# Patient Record
Sex: FEMALE | Race: WHITE | NOT HISPANIC OR LATINO | Employment: OTHER | RURAL
[De-identification: names, ages, dates, MRNs, and addresses within clinical notes are randomized per-mention and may not be internally consistent; named-entity substitution may affect disease eponyms.]

---

## 2021-05-05 ENCOUNTER — HISTORICAL (OUTPATIENT)
Dept: ADMINISTRATIVE | Facility: HOSPITAL | Age: 66
End: 2021-05-05

## 2021-05-05 LAB — SARS-COV+SARS-COV-2 AG RESP QL IA.RAPID: NEGATIVE

## 2021-11-03 ENCOUNTER — HOSPITAL ENCOUNTER (OUTPATIENT)
Dept: RADIOLOGY | Facility: HOSPITAL | Age: 66
Discharge: HOME | End: 2021-11-03
Attending: RADIOLOGY
Payer: OTHER GOVERNMENT

## 2021-11-03 VITALS — WEIGHT: 150 LBS | HEIGHT: 60 IN | BODY MASS INDEX: 29.45 KG/M2

## 2021-11-03 DIAGNOSIS — Z12.31 VISIT FOR SCREENING MAMMOGRAM: ICD-10-CM

## 2021-11-03 PROCEDURE — 77067 MAMMO DIGITAL SCREENING BILAT: ICD-10-PCS | Mod: 26,,, | Performed by: RADIOLOGY

## 2021-11-03 PROCEDURE — 77067 SCR MAMMO BI INCL CAD: CPT | Mod: 26,,, | Performed by: RADIOLOGY

## 2021-11-03 PROCEDURE — 77067 SCR MAMMO BI INCL CAD: CPT | Mod: TC

## 2022-05-17 ENCOUNTER — HOSPITAL ENCOUNTER (EMERGENCY)
Facility: HOSPITAL | Age: 67
Discharge: HOME | End: 2022-05-17
Attending: INTERNAL MEDICINE
Payer: MEDICARE

## 2022-05-17 VITALS
WEIGHT: 143.5 LBS | HEIGHT: 60 IN | SYSTOLIC BLOOD PRESSURE: 159 MMHG | HEART RATE: 62 BPM | OXYGEN SATURATION: 98 % | TEMPERATURE: 98 F | DIASTOLIC BLOOD PRESSURE: 88 MMHG | RESPIRATION RATE: 18 BRPM | BODY MASS INDEX: 28.17 KG/M2
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
## 2022-05-17 DIAGNOSIS — S09.90XA CLOSED HEAD INJURY, INITIAL ENCOUNTER: Primary | ICD-10-CM

## 2022-05-17 LAB
BASOPHILS # BLD AUTO: 0.04 K/UL (ref 0–0.2)
BASOPHILS NFR BLD AUTO: 0.9 % (ref 0–1)
DIFFERENTIAL METHOD BLD: ABNORMAL
EOSINOPHIL # BLD AUTO: 0.1 K/UL (ref 0–0.5)
EOSINOPHIL NFR BLD AUTO: 2.2 % (ref 1–4)
ERYTHROCYTE [DISTWIDTH] IN BLOOD BY AUTOMATED COUNT: 12.4 % (ref 11.5–14.5)
HCT VFR BLD AUTO: 38.7 % (ref 38–47)
HGB BLD-MCNC: 12.4 G/DL (ref 12–16)
IMM GRANULOCYTES # BLD AUTO: 0 K/UL (ref 0–0.04)
IMM GRANULOCYTES NFR BLD: 0 % (ref 0–0.4)
LYMPHOCYTES # BLD AUTO: 1.58 K/UL (ref 1–4.8)
LYMPHOCYTES NFR BLD AUTO: 34.9 % (ref 27–41)
MCH RBC QN AUTO: 31.2 PG (ref 27–31)
MCHC RBC AUTO-ENTMCNC: 32 G/DL (ref 32–36)
MCV RBC AUTO: 97.2 FL (ref 80–96)
MONOCYTES # BLD AUTO: 0.25 K/UL (ref 0–0.8)
MONOCYTES NFR BLD AUTO: 5.5 % (ref 2–6)
MPC BLD CALC-MCNC: 9.2 FL (ref 9.4–12.4)
NEUTROPHILS # BLD AUTO: 2.56 K/UL (ref 1.8–7.7)
NEUTROPHILS NFR BLD AUTO: 56.5 % (ref 53–65)
PLATELET # BLD AUTO: 250 K/UL (ref 150–400)
RBC # BLD AUTO: 3.98 M/UL (ref 4.2–5.4)
WBC # BLD AUTO: 4.53 K/UL (ref 4.5–11)

## 2022-05-17 PROCEDURE — 99282 EMERGENCY DEPT VISIT SF MDM: CPT | Performed by: INTERNAL MEDICINE

## 2022-05-17 PROCEDURE — 85025 COMPLETE CBC W/AUTO DIFF WBC: CPT | Performed by: INTERNAL MEDICINE

## 2022-05-17 PROCEDURE — 25000003 PHARM REV CODE 250: Performed by: INTERNAL MEDICINE

## 2022-05-17 PROCEDURE — 36415 COLL VENOUS BLD VENIPUNCTURE: CPT | Performed by: INTERNAL MEDICINE

## 2022-05-17 PROCEDURE — 99284 EMERGENCY DEPT VISIT MOD MDM: CPT | Mod: 25

## 2022-05-17 RX ORDER — LEVOTHYROXINE SODIUM 112 UG/1
0.11 TABLET ORAL
COMMUNITY
Start: 2021-09-02

## 2022-05-17 RX ORDER — TRAZODONE HYDROCHLORIDE 100 MG/1
150 TABLET ORAL
COMMUNITY
Start: 2021-12-20

## 2022-05-17 RX ORDER — BACITRACIN ZINC 500 [USP'U]/G
OINTMENT TOPICAL
Status: COMPLETED | OUTPATIENT
Start: 2022-05-17 | End: 2022-05-17

## 2022-05-17 RX ORDER — ACETAMINOPHEN 325 MG/1
650 TABLET ORAL
Status: COMPLETED | OUTPATIENT
Start: 2022-05-17 | End: 2022-05-17

## 2022-05-17 RX ORDER — ESTRADIOL 0.04 MG/D
PATCH TRANSDERMAL
COMMUNITY
Start: 2021-12-20 | End: 2023-04-05

## 2022-05-17 RX ADMIN — ACETAMINOPHEN 650 MG: 325 TABLET, FILM COATED ORAL at 10:05

## 2022-05-17 RX ADMIN — BACITRACIN: 500 OINTMENT TOPICAL at 09:05

## 2022-05-17 NOTE — ED NOTES
Ice pack placed to back of occiput- hematoma noted - dr watkins at bedside discussing pt findings with pt and daughter

## 2022-05-17 NOTE — ED PROVIDER NOTES
Encounter Date: 5/17/2022       History     Chief Complaint   Patient presents with    Fall     Patient presents with complaints of a fall this morning at home.  Patient states that she fell into a garbage container and hit the back of her head backwards.  Complaining of soreness the head.  There was no loss of conscious, incontinence urine or bowel, distal paralysis.  Patient denies being on any blood thinners.  And denies any other injuries.  Patient does have a history of osteopenia and osteoporosis        Review of patient's allergies indicates:   Allergen Reactions    Aleve [naproxen sodium]      History reviewed. No pertinent past medical history.  Past Surgical History:   Procedure Laterality Date    HYSTERECTOMY      OOPHORECTOMY       Family History   Problem Relation Age of Onset    Breast cancer Sister         Review of Systems   Constitutional: Negative for fever.   HENT: Negative for sore throat.    Respiratory: Negative for shortness of breath.    Cardiovascular: Negative for chest pain.   Gastrointestinal: Negative for nausea.   Genitourinary: Negative for dysuria.   Musculoskeletal: Negative for back pain.   Skin: Negative for rash.   Neurological: Negative for weakness.   Hematological: Does not bruise/bleed easily.       Physical Exam     Initial Vitals [05/17/22 0857]   BP Pulse Resp Temp SpO2   (!) 159/88 62 18 97.9 °F (36.6 °C) 98 %      MAP       --         Physical Exam    Vitals reviewed.  Constitutional: She appears well-developed.   HENT:   Head:       4 x 3 cm hematoma in the occipital area but no laceration or bleeding.   Eyes: Pupils are equal, round, and reactive to light.   Neck: Trachea normal. Neck supple.   Normal range of motion.   Full passive range of motion without pain.     Cardiovascular: Normal rate.   Pulmonary/Chest: Breath sounds normal.   Abdominal: Abdomen is soft.   Musculoskeletal:         General: Normal range of motion.      Cervical back: Full passive range of  motion without pain, normal range of motion and neck supple.     Neurological: She is alert. She has normal strength and normal reflexes. No cranial nerve deficit or sensory deficit. GCS eye subscore is 4. GCS verbal subscore is 5. GCS motor subscore is 6.   Skin: Skin is warm.        Superficial skin tear no bleeding   Psychiatric: She has a normal mood and affect.         Medical Screening Exam   See Full Note    ED Course   Procedures  Labs Reviewed   CBC WITH DIFFERENTIAL - Abnormal; Notable for the following components:       Result Value    RBC 3.98 (*)     MCV 97.2 (*)     MCH 31.2 (*)     MPV 9.2 (*)     All other components within normal limits   CBC W/ AUTO DIFFERENTIAL    Narrative:     The following orders were created for panel order CBC auto differential.  Procedure                               Abnormality         Status                     ---------                               -----------         ------                     CBC with Differential[[id number]]        Abnormal            Final result                 Please view results for these tests on the individual orders.          Imaging Results          CT Cervical Spine Without Contrast (Final result)  Result time 05/17/22 09:28:08    Final result by Keaton Cárdenas DO (05/17/22 09:28:08)                 Impression:      No convincing CT evidence of acute injury involving the osseous cervical spine.    The CT exam was performed using one or more of the following dose    reduction techniques- Automated exposure control, adjustment of the mA    and/or kV according to patient size, and/or use of iterative    reconstructed technique.    Point of Service: [facility]      Electronically signed by: Keaton Cárdenas  Date:    05/17/2022  Time:    09:28             Narrative:    EXAMINATION:  CT CERVICAL SPINE WITHOUT CONTRAST    CLINICAL HISTORY:  Neck trauma (Age >= 65y);    COMPARISON:  None    TECHNIQUE:  Multiple axial tomographic images of  the cervical spine were obtained without the use of intravenous contrast. Coronal and sagittal reformatted images provided.    FINDINGS:  There is straightening of normal cervical lordosis which may be positional or secondary to muscle spasm. There is no significant anterolisthesis or retrolisthesis.  Scattered degenerative change of the cervical spine.  Cervical vertebral body heights appear maintained.                               CT Head Without Contrast (Final result)  Result time 05/17/22 09:25:42    Final result by Dianne Sneed MD (05/17/22 09:25:42)                 Impression:      No acute intracranial pathology seen    This CT exam was performed using one or more of the following dose reduction techniques: Automated exposure control, adjustment of the mA and/or kV according to patient's size, or use of iterative reconstruction technique.      Electronically signed by: Dianne Sneed  Date:    05/17/2022  Time:    09:25             Narrative:    EXAMINATION:  CT HEAD WITHOUT CONTRAST    CLINICAL HISTORY:  Head trauma, minor (Age >= 65y);    FINDINGS:  Lateral ventricles are normal in size.  No acute intracranial hemorrhage, mass effect, or evidence of acute cortical stroke seen.  Artifact felt to be present at the periphery of the right cerebral hemisphere.  Soft tissue hematoma present at the convexity without acute underlying fracture seen.                                 Medications   bacitracin zinc ointment (has no administration in time range)   acetaminophen tablet 650 mg (has no administration in time range)                 ED Course as of 05/17/22 0948   Tue May 17, 2022   0935 CT Cervical Spine Without Contrast [PW]   0935 CT Head Without Contrast [PW]   0944 CBC auto differential(!) [PW]   0946 Recheck patient shows neurologic exam is normal, GCS 15, still with hematoma to place ice on it. [PW]      ED Course User Index  [PW] García Johnson MD          Clinical Impression:   Final  diagnoses:  [S09.90XA] Closed head injury, initial encounter (Primary)  [W19.XXXA] Fall, initial encounter          ED Disposition Condition    Discharge Stable        ED Prescriptions     None        Follow-up Information     Follow up With Specialties Details Why Contact Info    Lizzie Beltran MD Family Medicine In 2 days  [id number]  [facility]  [place] [id number] [phone]             Garíca Johnson MD  05/17/22 0985

## 2022-05-18 ENCOUNTER — TELEPHONE (OUTPATIENT)
Dept: EMERGENCY MEDICINE | Facility: HOSPITAL | Age: 67
End: 2022-05-18
Payer: OTHER GOVERNMENT

## 2022-12-13 ENCOUNTER — HOSPITAL ENCOUNTER (OUTPATIENT)
Dept: RADIOLOGY | Facility: HOSPITAL | Age: 67
Discharge: HOME | End: 2022-12-13
Attending: RADIOLOGY
Payer: MEDICARE

## 2022-12-13 DIAGNOSIS — R92.8 ABNORMAL MAMMOGRAM: ICD-10-CM

## 2022-12-13 DIAGNOSIS — Z12.31 VISIT FOR SCREENING MAMMOGRAM: ICD-10-CM

## 2022-12-13 PROCEDURE — 77067 MAMMO DIGITAL SCREENING BILAT: ICD-10-PCS | Mod: 26,59,ICN, | Performed by: RADIOLOGY

## 2022-12-13 PROCEDURE — 76641 ULTRASOUND BREAST COMPLETE: CPT | Mod: 26,50,, | Performed by: RADIOLOGY

## 2022-12-13 PROCEDURE — 77065 DX MAMMO INCL CAD UNI: CPT | Mod: 26,LT,, | Performed by: RADIOLOGY

## 2022-12-13 PROCEDURE — 77065 MAMMO DIGITAL DIAGNOSTIC LEFT: ICD-10-PCS | Mod: 26,LT,, | Performed by: RADIOLOGY

## 2022-12-13 PROCEDURE — 77065 DX MAMMO INCL CAD UNI: CPT | Mod: TC,LT

## 2022-12-13 PROCEDURE — 76641 ULTRASOUND BREAST COMPLETE: CPT | Mod: TC,50

## 2022-12-13 PROCEDURE — 77067 SCR MAMMO BI INCL CAD: CPT | Mod: TC

## 2022-12-13 PROCEDURE — 76641 US BREAST BILATERAL COMPLETE: ICD-10-PCS | Mod: 26,50,, | Performed by: RADIOLOGY

## 2022-12-13 PROCEDURE — 77067 SCR MAMMO BI INCL CAD: CPT | Mod: 26,59,ICN, | Performed by: RADIOLOGY

## 2022-12-20 ENCOUNTER — OFFICE VISIT (OUTPATIENT)
Dept: SURGERY | Facility: CLINIC | Age: 67
End: 2022-12-20
Attending: SURGERY
Payer: MEDICARE

## 2022-12-20 ENCOUNTER — CLINICAL SUPPORT (OUTPATIENT)
Dept: CARDIOLOGY | Facility: CLINIC | Age: 67
End: 2022-12-20
Attending: SURGERY
Payer: MEDICARE

## 2022-12-20 DIAGNOSIS — R92.0 MICROCALCIFICATION OF LEFT BREAST ON MAMMOGRAM: Primary | ICD-10-CM

## 2022-12-20 DIAGNOSIS — Z01.818 ENCOUNTER FOR OTHER PREPROCEDURAL EXAMINATION: ICD-10-CM

## 2022-12-20 PROCEDURE — 99204 OFFICE O/P NEW MOD 45 MIN: CPT | Mod: S$PBB,,, | Performed by: SURGERY

## 2022-12-20 PROCEDURE — 99213 OFFICE O/P EST LOW 20 MIN: CPT | Mod: PBBFAC,25 | Performed by: SURGERY

## 2022-12-20 PROCEDURE — 99212 OFFICE O/P EST SF 10 MIN: CPT | Mod: PBBFAC

## 2022-12-20 PROCEDURE — 93010 EKG 12-LEAD: ICD-10-PCS | Mod: S$PBB,,, | Performed by: INTERNAL MEDICINE

## 2022-12-20 PROCEDURE — 93010 ELECTROCARDIOGRAM REPORT: CPT | Mod: S$PBB,,, | Performed by: INTERNAL MEDICINE

## 2022-12-20 PROCEDURE — 99204 PR OFFICE/OUTPT VISIT, NEW, LEVL IV, 45-59 MIN: ICD-10-PCS | Mod: S$PBB,,, | Performed by: SURGERY

## 2022-12-20 PROCEDURE — 93005 ELECTROCARDIOGRAM TRACING: CPT | Mod: PBBFAC | Performed by: INTERNAL MEDICINE

## 2022-12-20 NOTE — PATIENT INSTRUCTIONS
[facility]  [address] [place] [address]      YOUR SURGERY DATE IS 01/05/2022. YOU WILL GO TO DR. DOWNS'S OFFICE THAT MORNING AT 0720AM.     LABWORK AND EKG IS SCHEDULED FOR TODAY. PLEASE SIGN IN ON 1ST FLOOR OF THE [facility] FOR LAB.  EKG IS LOCATED ON 2ND FLOOR.      DO NOT EAT OR DRINK ANYTHING AFTER MIDNIGHT BEFORE YOU SURGERY    BRING ALL MEDICATIONS YOU ARE CURRENTLY TAKING WITH YOU.  IF YOU ARE TAKING  A BLOOD PRESSURE MEDICATION, TAKE YOUR BLOOD PRESSURE MEDICATION WITH A   SIP OF WATER WHEN YOU GET UP THE MORNING OF SURGERY.     YOU MUST PRE-ADMIT AT THE FOLLOWING WEBSITE:  WEBSITE: [email or web address]  PASSWORD: [email or web address]    A STAFF MEMBER FROM THE [facility] WILL CALL YOU THE DAY BEFORE  SURGERY TO LET YOU KNOW WHAT TIME TO ARRIVE THE MORNING OF SURGERY.  IF YOU HAVE NOT HEARD FROM THEM BY 4 P.M. THE DAY BEFORE YOUR SURGERY,   PLEASE CALL THEM -[phone].      IF YOU HAVE ANY QUESTIONS YOU MAY CONTACT OUR OFFICE -[phone].

## 2022-12-20 NOTE — PROGRESS NOTES
General Surgery History and Physical      Patient ID: Kayleigh Mcgrath is a 67 y.o. female.    Chief Complaint: Breast Problem      HPI:  67-year-old female referred for new microcalcifications of her left breast.  Patient denies any symptoms.  She denies any pain, nipple discharge, skin changes.  She is never had a previous abnormal mammogram before.  No previous breast surgeries before.  She has a sister with breast cancer diagnosed in her 60s.  She does take estradiol patches after a hysterectomy for menorrhagia many years ago.    Review of Systems   Constitutional:  Negative for activity change, appetite change, fatigue and fever.   HENT:  Negative for trouble swallowing.    Respiratory:  Negative for cough and shortness of breath.    Cardiovascular:  Negative for chest pain and palpitations.   Gastrointestinal:  Negative for abdominal distention, abdominal pain, blood in stool, constipation and diarrhea.   Genitourinary:  Negative for flank pain.   Musculoskeletal:  Negative for neck pain and neck stiffness.   Neurological:  Negative for weakness.     Current Outpatient Medications   Medication Sig Dispense Refill    alirocumab (PRALUENT PEN) 150 mg/mL PnIj Inject into the skin.      estradioL (CLIMARA) 0.0375 mg/24 hr Place onto the skin.      levothyroxine (SYNTHROID) 112 MCG tablet Take 0.112 mg by mouth.      traZODone (DESYREL) 100 MG tablet Take 200 mg by mouth.       No current facility-administered medications for this visit.       Review of patient's allergies indicates:   Allergen Reactions    Aleve [naproxen sodium]        History reviewed. No pertinent past medical history.    Past Surgical History:   Procedure Laterality Date    HYSTERECTOMY      OOPHORECTOMY         Family History   Problem Relation Age of Onset    Breast cancer Sister        Social History     Socioeconomic History    Marital status:     Tobacco Use    Smoking status: Never    Smokeless tobacco: Never       There were no vitals filed for this visit.    Physical Exam  Constitutional:       General: She is not in acute distress.  HENT:      Head: Normocephalic.   Cardiovascular:      Rate and Rhythm: Normal rate and regular rhythm.      Pulses: Normal pulses.   Pulmonary:      Effort: Pulmonary effort is normal. No respiratory distress.      Breath sounds: Normal breath sounds.   Abdominal:      General: Abdomen is flat. There is no distension.      Palpations: Abdomen is soft.      Tenderness: There is no abdominal tenderness.   Musculoskeletal:         General: Normal range of motion.   Skin:     General: Skin is warm.   Neurological:      General: No focal deficit present.      Mental Status: She is oriented to person, place, and time.     Result:  US Breast Bilateral Complete  Mammo Digital Screening Bilat  Mammo Digital Diagnostic Left     History:  Patient is 67 y.o. and is seen for a screening mammogram.  Positive family history of breast cancer - sister   First child born at age 41      Films Compared:04/17/2007 through 11/03/2021.      Findings:  The breasts are heterogeneously dense, which may obscure small masses. No abnormalities are seen in the right breast.      Additional magnified views were obtained of the left breast for a 4 mm area of grouped microcalcifications in the left upper inner quadrant 8 cm radially from the nipple. These lie 6 cm posterior, 4 cm superior, and 4 cm medial to the nipple.      A breast examination was performed in conjunction with ultrasound to evaluate the area of microcalcifications in the left upper inner quadrant. All four breast quadrants and the retroareolar regions were scanned. The background tissue is homogeneous - fibroglandular.      I did not palpate a discrete mass. No lesions were seen in the soft tissue sonographically in the left upper inner quadrant. No abnormalities are seen  elsewhere in the left breast or axillary region. No abnormalities are seen in the right breast or axilla.      Impression:   There has been interval development of a 5 mm area of grouped microcalcifications in the left upper inner quadrant approximately 6 cm radially from the nipple. Findings may represent DCIS and biopsy is recommended. The patient will follow-up with Dr. Mukul Hernandez for consideration of needle localization excisional biopsy on Monday, December 19, 2022 at 1:30.      BI-RADS CATEGORY 4A - Suspicious abnormality - biopsy should be considered. Low suspicion for malignancy.      Recommendation:  Biopsy is recommended.     Your estimated lifetime risk of breast cancer (to age 85) based on Tyrer-Cuzick risk assessment model is 17.8 %.  According to the American Cancer Society, patients with a lifetime breast cancer risk of 20% or higher might benefit from supplemental screening tests. ??      Cc: Dr. Beltran    Assessment & Plan:    Microcalcification of left breast on mammogram  -     Mammo Needle Loc with Mammo Guidance 1st; Future; Expected date: 12/20/2023  -     Ambulatory Referral to External Surgery    Encounter for other preprocedural examination  -     Basic Metabolic Panel; Future; Expected date: 12/20/2022  -     CBC Auto Differential; Future; Expected date: 12/20/2022  -     EKG 12-lead; Future        Patient to go to the OR for a needle localized excision of left breast mass.  Risks and benefits explained to the patient including risk of bleeding, infection, need for additional operations, possibly negative biopsy, possible need for staging procedures.  All questions were answered.

## 2023-01-05 ENCOUNTER — OUTSIDE PLACE OF SERVICE (OUTPATIENT)
Dept: SURGERY | Facility: CLINIC | Age: 68
End: 2023-01-05
Payer: MEDICARE

## 2023-01-05 ENCOUNTER — HOSPITAL ENCOUNTER (OUTPATIENT)
Dept: RADIOLOGY | Facility: HOSPITAL | Age: 68
Discharge: HOME | End: 2023-01-05
Attending: RADIOLOGY
Payer: MEDICARE

## 2023-01-05 ENCOUNTER — HOSPITAL ENCOUNTER (OUTPATIENT)
Dept: RADIOLOGY | Facility: HOSPITAL | Age: 68
Discharge: HOME | End: 2023-01-05
Attending: SURGERY
Payer: MEDICARE

## 2023-01-05 DIAGNOSIS — R92.1 BREAST CALCIFICATIONS: ICD-10-CM

## 2023-01-05 DIAGNOSIS — R92.0 MICROCALCIFICATION OF LEFT BREAST ON MAMMOGRAM: ICD-10-CM

## 2023-01-05 PROCEDURE — 19125 EXCISION BREAST LESION: CPT | Mod: LT,,, | Performed by: SURGERY

## 2023-01-05 PROCEDURE — 19281 MAMMO NEEDLE LOC WITH MAMMO GUIDANCE 1ST SITE: ICD-10-PCS | Mod: LT,,, | Performed by: RADIOLOGY

## 2023-01-05 PROCEDURE — 76098 X-RAY EXAM SURGICAL SPECIMEN: CPT | Mod: 26,,, | Performed by: RADIOLOGY

## 2023-01-05 PROCEDURE — 19125 PR EXCISE BREAST LES W XRAY MARKER: ICD-10-PCS | Mod: LT,,, | Performed by: SURGERY

## 2023-01-05 PROCEDURE — 76098 MAMMO BREAST SPECIMEN: ICD-10-PCS | Mod: 26,,, | Performed by: RADIOLOGY

## 2023-01-05 PROCEDURE — 19281 PERQ DEVICE BREAST 1ST IMAG: CPT | Mod: LT,,, | Performed by: RADIOLOGY

## 2023-01-05 PROCEDURE — 19281 PERQ DEVICE BREAST 1ST IMAG: CPT

## 2023-01-05 PROCEDURE — 76098 X-RAY EXAM SURGICAL SPECIMEN: CPT | Mod: TC

## 2023-01-05 RX ORDER — TRAMADOL HYDROCHLORIDE 50 MG/1
50 TABLET ORAL EVERY 6 HOURS PRN
Qty: 15 TABLET | Refills: 0 | Status: SHIPPED | OUTPATIENT
Start: 2023-01-05 | End: 2023-04-05

## 2023-01-05 RX ORDER — HYDROCODONE BITARTRATE AND ACETAMINOPHEN 5; 325 MG/1; MG/1
1 TABLET ORAL EVERY 6 HOURS PRN
Qty: 15 TABLET | Refills: 0 | Status: SHIPPED | OUTPATIENT
Start: 2023-01-05 | End: 2023-04-05

## 2023-01-05 NOTE — PROGRESS NOTES
Patient has rxn to [facility].  Called pharmacy and canceled it and resent a rx for ultram 50

## 2023-01-10 ENCOUNTER — TELEPHONE (OUTPATIENT)
Dept: SURGERY | Facility: CLINIC | Age: 68
End: 2023-01-10
Payer: MEDICARE

## 2023-01-10 NOTE — TELEPHONE ENCOUNTER
Patient called back about pathology results. Discussed benign results with patient and let her know to keep her follow up appointment on Thursday 1/12/23. Patient verbalized understanding.

## 2023-01-10 NOTE — TELEPHONE ENCOUNTER
----- Message from Vita Little sent at 1/10/2023  3:25 PM CST -----  Regarding: bx results  Pt was calling for breast bx results    Call back [phone]

## 2023-01-12 ENCOUNTER — OFFICE VISIT (OUTPATIENT)
Dept: SURGERY | Facility: CLINIC | Age: 68
End: 2023-01-12
Attending: SURGERY
Payer: MEDICARE

## 2023-01-12 DIAGNOSIS — Z09 FOLLOW-UP EXAMINATION, FOLLOWING OTHER SURGERY: Primary | ICD-10-CM

## 2023-01-12 PROCEDURE — 99499 UNLISTED E&M SERVICE: CPT | Mod: S$PBB,,, | Performed by: SURGERY

## 2023-01-12 PROCEDURE — 99213 OFFICE O/P EST LOW 20 MIN: CPT | Mod: PBBFAC | Performed by: SURGERY

## 2023-01-12 PROCEDURE — 99499 NO LOS: ICD-10-PCS | Mod: S$PBB,,, | Performed by: SURGERY

## 2023-01-12 NOTE — PROGRESS NOTES
Post-operative Note    HPI:  The patient is status post needle localized left breast mass excision.  This was done about a week ago.  She is been doing pretty well overall except for today where earlier she had some kind of pins and needles feeling around her nipple area but it went away by the time she made to clinic.  Otherwise no fever no chills no drainage no redness no other symptoms besides some mild bruising around her incision.    PHYSICAL EXAM:    Mild ecchymosis around the incision area overall is well healed no redness tenderness or induration.  No pain with palpation anywhere along her left breast.    Recent Results (from the past 504 hour(s))   Surgical Pathology    Collection Time: 01/05/23 10:35 AM   Result Value Ref Range    Case Report       Surgical Pathology                                Case: [id number]                                   Authorizing Provider:  Elvin Hernandez MD        Collected:           01/05/2023 10:35 AM          Ordering Location:     [facility] -     Received:            01/05/2023 10:57 AM                                 Laboratory                                                                   Pathologist:           Nikoals Torrez MD                                                       Specimen:    Breast, Left, Mass                                                                         Final Diagnosis       A. Left breast mass, needle-localized excisional biopsy:  - Benign breast parenchyma with scattered microcalcifications  - No overt malignancy or in situ carcinoma is identified      Gross Description       A. Breast, Left, Mass:   The specimen is received fresh labeled left breast mass and consists of portion of predominantly fatty tissue measuring 6.1 x 4.2 x 2.1 cm.  The margins are inked as follows:  Superior blue, inferior red, lateral green, medial orange  and deep black.  The is a localization needle inserted in the specimen and accompanying x-rays demonstrating a cluster of calcifications.  The cut surfaces are fatty with no gross mass.  The tissue will be x-rayed to localized the calcifications and representative sections to include the calcifications submitted in blocks A1-A5.      Fixative: 10% Neutral Buffered Formalin  Cold Ischemia Time:   Fixation Time:     Grossing was completed by Alon Shannon III.      Microscopic Description       A microscopic examination was performed and the diagnosis reflects the findings.            Laboratory Notes       If this report includes immunohistochemical (IHC) test results, please note the following: IHC studies were interpreted in conjunction with appropriate positive and negative controls which demonstrate the expected positive and negative reactivity. This laboratory is regulated under CLIA as qualified to perform high-complexity testing. IHC tests are used for clinical purposes. They should not be regarded as investigational or research.              ASSESSMENT:      The patient is doing well after surgery.       PLAN:      Follow up PRN.    Patient will go back to with a routine normal mammograms.

## 2023-04-05 ENCOUNTER — OFFICE VISIT (OUTPATIENT)
Dept: CARDIOLOGY | Facility: CLINIC | Age: 68
End: 2023-04-05
Payer: MEDICARE

## 2023-04-05 VITALS
DIASTOLIC BLOOD PRESSURE: 80 MMHG | WEIGHT: 146 LBS | SYSTOLIC BLOOD PRESSURE: 130 MMHG | BODY MASS INDEX: 27.56 KG/M2 | OXYGEN SATURATION: 98 % | HEART RATE: 80 BPM | HEIGHT: 61 IN

## 2023-04-05 DIAGNOSIS — E03.9 ACQUIRED HYPOTHYROIDISM: ICD-10-CM

## 2023-04-05 DIAGNOSIS — E78.5 HYPERLIPIDEMIA, UNSPECIFIED HYPERLIPIDEMIA TYPE: ICD-10-CM

## 2023-04-05 DIAGNOSIS — K21.9 GASTROESOPHAGEAL REFLUX DISEASE, UNSPECIFIED WHETHER ESOPHAGITIS PRESENT: ICD-10-CM

## 2023-04-05 DIAGNOSIS — I10 ESSENTIAL HYPERTENSION: Primary | ICD-10-CM

## 2023-04-05 PROCEDURE — 99213 OFFICE O/P EST LOW 20 MIN: CPT | Mod: PBBFAC | Performed by: INTERNAL MEDICINE

## 2023-04-05 PROCEDURE — 99204 PR OFFICE/OUTPT VISIT, NEW, LEVL IV, 45-59 MIN: ICD-10-PCS | Mod: S$PBB,,, | Performed by: INTERNAL MEDICINE

## 2023-04-05 PROCEDURE — 99204 OFFICE O/P NEW MOD 45 MIN: CPT | Mod: S$PBB,,, | Performed by: INTERNAL MEDICINE

## 2023-04-05 RX ORDER — DOCUSATE SODIUM 100 MG/1
100 CAPSULE, LIQUID FILLED ORAL DAILY PRN
COMMUNITY

## 2023-04-05 RX ORDER — CHOLECALCIFEROL (VITAMIN D3) 25 MCG
1000 TABLET ORAL DAILY
COMMUNITY

## 2023-04-05 NOTE — PROGRESS NOTES
Cardiology Clinic Note:    PCP: Lizzie Beltran MD    REFERRING PHYSICIAN:     CHIEF COMPLAINT: Hyperlipidemia    HISTORY OF PRESENT ILLNESS:  Kayleigh Mcgrath is a 68 y.o. female who presents for evaluation of hyperlipidemia.  She underwent extensive evaluation for palpitations, including echo, stress and LHC approximately 18 months ago, all reportedly negative, changed Crestor to Praluent with resolution of symptoms.  She is now asymptomatic, very active around the house without provocation of chest  pain, pressure or shortness of breath.        Review of Systems:  Review of Systems   Constitutional: Negative for diaphoresis, malaise/fatigue, night sweats and weight gain.   HENT:  Negative for congestion, ear pain, hearing loss, nosebleeds and sore throat.    Eyes:  Negative for blurred vision, double vision, pain, photophobia and visual disturbance.   Cardiovascular:  Positive for palpitations. Negative for chest pain, claudication, dyspnea on exertion, irregular heartbeat, leg swelling, near-syncope, orthopnea and syncope.   Respiratory:  Negative for cough, shortness of breath, sleep disturbances due to breathing, snoring and wheezing.         Diag with COPD, however asymptomatic   Endocrine: Negative for cold intolerance, heat intolerance, polydipsia, polyphagia and polyuria.        On thyroid replacement x many years   Hematologic/Lymphatic: Negative for bleeding problem. Does not bruise/bleed easily.   Skin:  Negative for dry skin, flushing, itching, rash and skin cancer.   Musculoskeletal:  Positive for arthritis and back pain. Negative for falls, joint pain, muscle cramps, muscle weakness and myalgias.   Gastrointestinal:  Positive for heartburn. Negative for abdominal pain, change in bowel habit, constipation, diarrhea, dysphagia, nausea and vomiting.        GERD controlled with apple cider vinegar   Genitourinary:  Negative for bladder incontinence, dysuria, flank pain, frequency and nocturia.    Neurological:  Negative for dizziness, focal weakness, headaches, light-headedness, loss of balance, numbness, paresthesias and seizures.   Psychiatric/Behavioral:  Negative for depression, memory loss and substance abuse. The patient is not nervous/anxious.    Allergic/Immunologic: Negative for environmental allergies.        PAST MEDICAL HISTORY:  Past Medical History:   Diagnosis Date    Anemia, unspecified     Hyperlipidemia     Hypertension     Hypothyroidism, unspecified        PAST SURGICAL HISTORY:  Past Surgical History:   Procedure Laterality Date    BILATERAL TUBAL LIGATION      HEMORRHOID SURGERY      hemorrhoidectomy    HYSTERECTOMY      LEFT HEART CATHETERIZATION      OOPHORECTOMY         SOCIAL HISTORY:  Social History     Socioeconomic History    Marital status:    Tobacco Use    Smoking status: Never    Smokeless tobacco: Never       FAMILY HISTORY:  Family History   Problem Relation Age of Onset    Atrial fibrillation Mother         paroxysmal    Heart attack Father         reason for death    Breast cancer Sister     Heart attack Sister         reason for death    Pacemaker/defibrilator Brother         s/p PPM       ALLERGIES:  Allergies as of 04/05/2023 - Reviewed 04/05/2023   Allergen Reaction Noted    Aleve [naproxen sodium]  12/08/2005         MEDICATIONS:  Current Outpatient Medications on File Prior to Visit   Medication Sig Dispense Refill    alirocumab (PRALUENT PEN) 150 mg/mL PnIlia Inject into the skin every 30 days.      docusate sodium (COLACE) 100 MG capsule Take 100 mg by mouth daily as needed for Constipation.      levothyroxine (SYNTHROID) 112 MCG tablet Take 0.112 mg by mouth.      traZODone (DESYREL) 100 MG tablet Take 150 mg by mouth.      vitamin D (VITAMIN D3) 1000 units Tab Take 1,000 Units by mouth once daily.      estradioL (CLIMARA) 0.0375 mg/24 hr Place onto the skin.      HYDROcodone-acetaminophen (NORCO) 5-325 mg per tablet Take 1 tablet by mouth every 6 (six)  "hours as needed for Pain. (Patient not taking: Reported on 4/5/2023) 15 tablet 0    traMADoL (ULTRAM) 50 mg tablet Take 1 tablet (50 mg total) by mouth every 6 (six) hours as needed for Pain. 15 tablet 0     No current facility-administered medications on file prior to visit.          PHYSICAL EXAM:  Blood pressure 130/80, pulse 80, height 5' 1" (1.549 m), weight 66.2 kg (146 lb), SpO2 98 %.  Wt Readings from Last 3 Encounters:   04/05/23 66.2 kg (146 lb)   05/17/22 65.1 kg (143 lb 8.3 oz)   11/03/21 68 kg (150 lb)      Body mass index is 27.59 kg/m².    Physical Exam  Vitals and nursing note reviewed.   Constitutional:       Appearance: Normal appearance. She is normal weight.   HENT:      Head: Normocephalic and atraumatic.      Right Ear: External ear normal.      Left Ear: External ear normal.   Eyes:      General: No scleral icterus.        Right eye: No discharge.         Left eye: No discharge.      Extraocular Movements: Extraocular movements intact.      Conjunctiva/sclera: Conjunctivae normal.      Pupils: Pupils are equal, round, and reactive to light.   Cardiovascular:      Rate and Rhythm: Normal rate and regular rhythm.      Pulses: Normal pulses.      Heart sounds: Normal heart sounds. No murmur heard.    No friction rub. No gallop.   Pulmonary:      Effort: Pulmonary effort is normal.      Breath sounds: Normal breath sounds. No wheezing, rhonchi or rales.   Chest:      Chest wall: No tenderness.   Abdominal:      General: Abdomen is flat. Bowel sounds are normal. There is no distension.      Palpations: Abdomen is soft.      Tenderness: There is no abdominal tenderness. There is no guarding or rebound.   Musculoskeletal:         General: No swelling or tenderness. Normal range of motion.      Cervical back: Normal range of motion and neck supple.   Skin:     General: Skin is warm and dry.      Findings: No erythema or rash.   Neurological:      General: No focal deficit present.      Mental Status: " She is alert and oriented to person, place, and time.      Cranial Nerves: No cranial nerve deficit.      Motor: No weakness.      Gait: Gait normal.   Psychiatric:         Mood and Affect: Mood normal.         Behavior: Behavior normal.         Thought Content: Thought content normal.         Judgment: Judgment normal.        LABS REVIEWED:  Lab Results   Component Value Date    WBC 6.55 12/20/2022    RBC 4.31 12/20/2022    HGB 13.1 12/20/2022    HCT 41.5 12/20/2022    MCV 96.3 (H) 12/20/2022    MCH 30.4 12/20/2022    MCHC 31.6 (L) 12/20/2022    RDW 11.9 12/20/2022     12/20/2022    MPV 9.6 12/20/2022    NRBC 0.0 12/20/2022     Lab Results   Component Value Date     12/20/2022    K 3.8 12/20/2022     12/20/2022    CO2 29 12/20/2022    BUN 12 12/20/2022     Lab Results   Component Value Date    AST 16 09/14/2022    ALT 18 09/14/2022     Lab Results   Component Value Date    GLU 91 12/20/2022     Lab Results   Component Value Date    CHOL 183 09/14/2022    HDL 62 (H) 09/14/2022    TRIG 163 (H) 09/14/2022    CHOLHDL 3.0 09/14/2022       CARDIAC STUDIES REVIEWED:    OTHER IMAGING STUDIES REVIEWED:        ASSESSMENT: PLAN:  1. Hyperlipidemia: controled on Praulent, continue same  2. Hypertension: controlled.   3. GERD - controlled with OTC meds.   4. Hypothyroid: on replacement, asymptomatic  5. Anxiety : controlled on current meds    Continue Praulent, follow up in one year, sooner if symptoms change.

## 2024-03-12 ENCOUNTER — OFFICE VISIT (OUTPATIENT)
Dept: CARDIOLOGY | Facility: CLINIC | Age: 69
End: 2024-03-12
Payer: MEDICARE

## 2024-03-12 VITALS
WEIGHT: 142 LBS | DIASTOLIC BLOOD PRESSURE: 70 MMHG | SYSTOLIC BLOOD PRESSURE: 100 MMHG | HEIGHT: 61 IN | HEART RATE: 72 BPM | OXYGEN SATURATION: 96 % | BODY MASS INDEX: 26.81 KG/M2

## 2024-03-12 DIAGNOSIS — E78.2 MIXED HYPERLIPIDEMIA: ICD-10-CM

## 2024-03-12 DIAGNOSIS — I10 ESSENTIAL HYPERTENSION: Primary | ICD-10-CM

## 2024-03-12 DIAGNOSIS — K21.9 GASTROESOPHAGEAL REFLUX DISEASE, UNSPECIFIED WHETHER ESOPHAGITIS PRESENT: ICD-10-CM

## 2024-03-12 PROCEDURE — 93010 ELECTROCARDIOGRAM REPORT: CPT | Mod: S$PBB,,, | Performed by: INTERNAL MEDICINE

## 2024-03-12 PROCEDURE — 99213 OFFICE O/P EST LOW 20 MIN: CPT | Mod: PBBFAC | Performed by: INTERNAL MEDICINE

## 2024-03-12 PROCEDURE — 93005 ELECTROCARDIOGRAM TRACING: CPT | Mod: PBBFAC | Performed by: INTERNAL MEDICINE

## 2024-03-12 PROCEDURE — 99213 OFFICE O/P EST LOW 20 MIN: CPT | Mod: S$PBB,,, | Performed by: INTERNAL MEDICINE

## 2024-03-12 NOTE — PROGRESS NOTES
Cardiology Clinic Note:    PCP: Lizzie Beltran MD    REFERRING PHYSICIAN:     CHIEF COMPLAINT: Hyperlipidemia    HISTORY OF PRESENT ILLNESS:  Kayleigh Mcgrath is a 69 y.o. female who presents for evaluation of hyperlipidemia.  She reports occasonal episodes of palpitations, occur once a month, come and go spontaneously, last seconds up to three minutes, resolve with rest.  She underwent extensive evaluation for palpitations, including echo, stress and LHC, all reportedly negative, changed Crestor to Praluent with resolution of symptoms.  She is now asymptomatic, very active around the house without provocation of chest  pain, pressure or shortness of breath.    She does not feel symptoms warrant change in meds.       Review of Systems:  Review of Systems   Constitutional: Negative for diaphoresis, malaise/fatigue, night sweats and weight gain.   HENT:  Negative for congestion, ear pain, hearing loss, nosebleeds and sore throat.    Eyes:  Negative for blurred vision, double vision, pain, photophobia and visual disturbance.   Cardiovascular:  Positive for palpitations. Negative for chest pain, claudication, dyspnea on exertion, irregular heartbeat, leg swelling, near-syncope, orthopnea and syncope.   Respiratory:  Negative for cough, shortness of breath, sleep disturbances due to breathing, snoring and wheezing.         Diag with COPD, however asymptomatic   Endocrine: Negative for cold intolerance, heat intolerance, polydipsia, polyphagia and polyuria.        On thyroid replacement x many years   Hematologic/Lymphatic: Negative for bleeding problem. Does not bruise/bleed easily.   Skin:  Negative for dry skin, flushing, itching, rash and skin cancer.   Musculoskeletal:  Positive for arthritis and back pain. Negative for falls, joint pain, muscle cramps, muscle weakness and myalgias.   Gastrointestinal:  Positive for heartburn. Negative for abdominal pain, change in bowel habit, constipation, diarrhea, dysphagia,  nausea and vomiting.        GERD controlled with apple cider vinegar   Genitourinary:  Negative for bladder incontinence, dysuria, flank pain, frequency and nocturia.   Neurological:  Negative for dizziness, focal weakness, headaches, light-headedness, loss of balance, numbness, paresthesias and seizures.   Psychiatric/Behavioral:  Negative for depression, memory loss and substance abuse. The patient is not nervous/anxious.    Allergic/Immunologic: Negative for environmental allergies.          PAST MEDICAL HISTORY:  Past Medical History:   Diagnosis Date    Anemia, unspecified     Hyperlipidemia     Hypertension     Hypothyroidism, unspecified        PAST SURGICAL HISTORY:  Past Surgical History:   Procedure Laterality Date    BILATERAL TUBAL LIGATION      HEMORRHOID SURGERY      hemorrhoidectomy    HYSTERECTOMY      LEFT HEART CATHETERIZATION      OOPHORECTOMY         SOCIAL HISTORY:  Social History     Socioeconomic History    Marital status:    Tobacco Use    Smoking status: Never    Smokeless tobacco: Never       FAMILY HISTORY:  Family History   Problem Relation Age of Onset    Atrial fibrillation Mother         paroxysmal    Heart attack Father         reason for death    Breast cancer Sister     Heart attack Sister         reason for death    Pacemaker/defibrilator Brother         s/p PPM       ALLERGIES:  Allergies as of 03/12/2024 - Reviewed 03/12/2024   Allergen Reaction Noted    Aleve [naproxen sodium]  12/08/2005         MEDICATIONS:  Current Outpatient Medications on File Prior to Visit   Medication Sig Dispense Refill    alirocumab (PRALUENT PEN) 150 mg/mL PnIlia Inject into the skin every 30 days.      docusate sodium (COLACE) 100 MG capsule Take 100 mg by mouth daily as needed for Constipation.      levothyroxine (SYNTHROID) 112 MCG tablet Take 0.112 mg by mouth.      traZODone (DESYREL) 100 MG tablet Take 150 mg by mouth.      vitamin D (VITAMIN D3) 1000 units Tab Take 1,000 Units by mouth  "once daily.       No current facility-administered medications on file prior to visit.          PHYSICAL EXAM:  Blood pressure 100/70, pulse 72, height 5' 1" (1.549 m), weight 64.4 kg (142 lb), SpO2 96 %.  Wt Readings from Last 3 Encounters:   03/12/24 64.4 kg (142 lb)   04/05/23 66.2 kg (146 lb)   05/17/22 65.1 kg (143 lb 8.3 oz)      Body mass index is 26.83 kg/m².    Physical Exam  Vitals and nursing note reviewed.   Constitutional:       Appearance: Normal appearance. She is normal weight.   HENT:      Head: Normocephalic and atraumatic.      Right Ear: External ear normal.      Left Ear: External ear normal.   Eyes:      General: No scleral icterus.        Right eye: No discharge.         Left eye: No discharge.      Extraocular Movements: Extraocular movements intact.      Conjunctiva/sclera: Conjunctivae normal.      Pupils: Pupils are equal, round, and reactive to light.   Cardiovascular:      Rate and Rhythm: Normal rate and regular rhythm.      Pulses: Normal pulses.      Heart sounds: Normal heart sounds. No murmur heard.     No friction rub. No gallop.   Pulmonary:      Effort: Pulmonary effort is normal.      Breath sounds: Normal breath sounds. No wheezing, rhonchi or rales.   Chest:      Chest wall: No tenderness.   Abdominal:      General: Abdomen is flat. Bowel sounds are normal. There is no distension.      Palpations: Abdomen is soft.      Tenderness: There is no abdominal tenderness. There is no guarding or rebound.   Musculoskeletal:         General: No swelling or tenderness. Normal range of motion.      Cervical back: Normal range of motion and neck supple.   Skin:     General: Skin is warm and dry.      Findings: No erythema or rash.   Neurological:      General: No focal deficit present.      Mental Status: She is alert and oriented to person, place, and time.      Cranial Nerves: No cranial nerve deficit.      Motor: No weakness.      Gait: Gait normal.   Psychiatric:         Mood and " Affect: Mood normal.         Behavior: Behavior normal.         Thought Content: Thought content normal.         Judgment: Judgment normal.          LABS REVIEWED:  Lab Results   Component Value Date    WBC 6.55 12/20/2022    RBC 4.31 12/20/2022    HGB 13.1 12/20/2022    HCT 41.5 12/20/2022    MCV 96.3 (H) 12/20/2022    MCH 30.4 12/20/2022    MCHC 31.6 (L) 12/20/2022    RDW 11.9 12/20/2022     12/20/2022    MPV 9.6 12/20/2022    NRBC 0.0 12/20/2022     Lab Results   Component Value Date     12/20/2022    K 3.8 12/20/2022     12/20/2022    CO2 29 12/20/2022    BUN 12 12/20/2022     Lab Results   Component Value Date    AST 16 09/14/2022    ALT 18 09/14/2022     Lab Results   Component Value Date    GLU 91 12/20/2022     Lab Results   Component Value Date    CHOL 183 09/14/2022    HDL 62 (H) 09/14/2022    TRIG 163 (H) 09/14/2022    CHOLHDL 3.0 09/14/2022       CARDIAC STUDIES REVIEWED:    OTHER IMAGING STUDIES REVIEWED:        ASSESSMENT: PLAN:  1. Hyperlipidemia: controled on Praulent, continue same  2. Hypertension: controlled.   3. GERD - controlled with OTC meds.   4. Hypothyroid: on replacement, asymptomatic  5. Anxiety : controlled on current meds    Continue Praulent, follow up in one year, sooner if symptoms change.

## 2024-03-19 ENCOUNTER — HOSPITAL ENCOUNTER (OUTPATIENT)
Dept: RADIOLOGY | Facility: HOSPITAL | Age: 69
Discharge: HOME | End: 2024-03-19
Payer: MEDICARE

## 2024-03-19 DIAGNOSIS — Z12.31 VISIT FOR SCREENING MAMMOGRAM: ICD-10-CM

## 2024-03-19 PROCEDURE — 77063 BREAST TOMOSYNTHESIS BI: CPT | Mod: 26,,, | Performed by: RADIOLOGY

## 2024-03-19 PROCEDURE — 77067 SCR MAMMO BI INCL CAD: CPT | Mod: TC

## 2024-03-19 PROCEDURE — 77067 SCR MAMMO BI INCL CAD: CPT | Mod: 26,,, | Performed by: RADIOLOGY

## 2024-03-26 LAB
OHS QRS DURATION: 68 MS
OHS QTC CALCULATION: 427 MS

## 2024-06-04 ENCOUNTER — TELEPHONE (OUTPATIENT)
Dept: CARDIOLOGY | Facility: CLINIC | Age: 69
End: 2024-06-04
Payer: MEDICARE

## 2024-06-04 NOTE — TELEPHONE ENCOUNTER
Pt left a voicemail on 6/4/24 on behalf of her  Taran Mcgrath. Returned patient's call and didn't receive a answer and I left a voicemail with my office number.

## 2025-03-10 ENCOUNTER — TELEPHONE (OUTPATIENT)
Dept: CARDIOLOGY | Facility: CLINIC | Age: 70
End: 2025-03-10
Payer: MEDICARE

## 2025-03-10 NOTE — TELEPHONE ENCOUNTER
Spoke to Rosa on today from Dr. Duval's office. They were requesting a surgery clearance for her upcoming procedure on 03/27/25. Explained to Rosa that the patient hasn't been seen in almost a year and that we have her an appt made for 03/19. Informed her that she can send over the request and that when Dr. Perry sees her that we will have him to add her risk assessment. Rosa verbalized understanding.----- Message from Halina sent at 3/10/2025  2:13 PM CDT -----  Regarding: asking for return call  Who Called: Rosa with Dr. DuvalAsking for a returning phone callWho Left Message for Patient:  Elsy Call Back Number, if different: [phone] Information:  surgery clearance

## 2025-03-19 ENCOUNTER — OFFICE VISIT (OUTPATIENT)
Dept: CARDIOLOGY | Facility: CLINIC | Age: 70
End: 2025-03-19
Payer: MEDICARE

## 2025-03-19 VITALS
HEART RATE: 85 BPM | DIASTOLIC BLOOD PRESSURE: 70 MMHG | OXYGEN SATURATION: 97 % | BODY MASS INDEX: 24.55 KG/M2 | SYSTOLIC BLOOD PRESSURE: 110 MMHG | WEIGHT: 130 LBS | HEIGHT: 61 IN

## 2025-03-19 DIAGNOSIS — E78.5 HYPERLIPIDEMIA, UNSPECIFIED HYPERLIPIDEMIA TYPE: ICD-10-CM

## 2025-03-19 DIAGNOSIS — I10 ESSENTIAL HYPERTENSION: Primary | ICD-10-CM

## 2025-03-19 DIAGNOSIS — R00.2 INTERMITTENT PALPITATIONS: ICD-10-CM

## 2025-03-19 PROCEDURE — 99999 PR PBB SHADOW E&M-EST. PATIENT-LVL IV: CPT | Mod: PBBFAC,,, | Performed by: INTERNAL MEDICINE

## 2025-03-19 PROCEDURE — 93005 ELECTROCARDIOGRAM TRACING: CPT | Mod: PBBFAC | Performed by: INTERNAL MEDICINE

## 2025-03-19 PROCEDURE — 93010 ELECTROCARDIOGRAM REPORT: CPT | Mod: S$PBB,,, | Performed by: INTERNAL MEDICINE

## 2025-03-19 PROCEDURE — 99214 OFFICE O/P EST MOD 30 MIN: CPT | Mod: PBBFAC | Performed by: INTERNAL MEDICINE

## 2025-03-19 PROCEDURE — 99214 OFFICE O/P EST MOD 30 MIN: CPT | Mod: S$PBB,,, | Performed by: INTERNAL MEDICINE

## 2025-03-19 NOTE — PROGRESS NOTES
Cardiology Clinic Note:    PCP: Lizzie Beltran MD    REFERRING PHYSICIAN:     CHIEF COMPLAINT: Hyperlipidemia    HISTORY OF PRESENT ILLNESS:  Kayleigh Mcgrath is a 70 y.o. female who presents for evaluation of hyperlipidemia.  She reports occasonal episodes of palpitations, occur once a month, come and go spontaneously, last seconds up to three minutes, resolve with rest.  She underwent extensive evaluation for palpitations, including echo, stress and LHC, all reportedly negative, changed Crestor to Praluent with resolution of symptoms.  She is no longer completely asymptomatic, having an episodes of palpitations spontaneously, once or twice a month, lasts less than five seconds, is not symptomatic enough to change meds.  She is very active around the house without provocation of chest  pain, pressure or shortness of breath.    She does not feel symptoms warrant change in meds.       Review of Systems:  Review of Systems   Constitutional: Negative for diaphoresis, malaise/fatigue, night sweats and weight gain.   HENT:  Negative for congestion, ear pain, hearing loss, nosebleeds and sore throat.    Eyes:  Negative for blurred vision, double vision, pain, photophobia and visual disturbance.   Cardiovascular:  Positive for palpitations. Negative for chest pain, claudication, dyspnea on exertion, irregular heartbeat, leg swelling, near-syncope, orthopnea and syncope.   Respiratory:  Negative for cough, shortness of breath, sleep disturbances due to breathing, snoring and wheezing.         Diag with COPD, however asymptomatic   Endocrine: Negative for cold intolerance, heat intolerance, polydipsia, polyphagia and polyuria.        On thyroid replacement x many years   Hematologic/Lymphatic: Negative for bleeding problem. Does not bruise/bleed easily.   Skin:  Negative for dry skin, flushing, itching, rash and skin cancer.   Musculoskeletal:  Positive for arthritis and back pain. Negative for falls, joint pain, muscle  cramps, muscle weakness and myalgias.   Gastrointestinal:  Positive for heartburn. Negative for abdominal pain, change in bowel habit, constipation, diarrhea, dysphagia, nausea and vomiting.        GERD controlled with apple cider vinegar   Genitourinary:  Negative for bladder incontinence, dysuria, flank pain, frequency and nocturia.   Neurological:  Negative for dizziness, focal weakness, headaches, light-headedness, loss of balance, numbness, paresthesias and seizures.   Psychiatric/Behavioral:  Negative for depression, memory loss and substance abuse. The patient is not nervous/anxious.    Allergic/Immunologic: Negative for environmental allergies.          PAST MEDICAL HISTORY:  Past Medical History:   Diagnosis Date    Anemia, unspecified     Hyperlipidemia     Hypertension     Hypothyroidism, unspecified        PAST SURGICAL HISTORY:  Past Surgical History:   Procedure Laterality Date    BILATERAL TUBAL LIGATION      HEMORRHOID SURGERY      hemorrhoidectomy    HYSTERECTOMY      LEFT HEART CATHETERIZATION      OOPHORECTOMY         SOCIAL HISTORY:  Social History     Socioeconomic History    Marital status:    Tobacco Use    Smoking status: Never    Smokeless tobacco: Never       FAMILY HISTORY:  Family History   Problem Relation Name Age of Onset    Atrial fibrillation Mother          paroxysmal    Heart attack Father          reason for death    Breast cancer Sister      Heart attack Sister          reason for death    Pacemaker/defibrilator Brother          s/p PPM       ALLERGIES:  Allergies as of 03/19/2025 - Reviewed 03/19/2025   Allergen Reaction Noted    Aleve [naproxen sodium]  12/08/2005         MEDICATIONS:  Current Outpatient Medications on File Prior to Visit   Medication Sig Dispense Refill    alirocumab (PRALUENT PEN) 150 mg/mL PnIlia Inject into the skin every 30 days.      docusate sodium (COLACE) 100 MG capsule Take 100 mg by mouth daily as needed for Constipation.      levothyroxine  "(SYNTHROID) 112 MCG tablet Take 0.112 mg by mouth.      traZODone (DESYREL) 100 MG tablet Take 150 mg by mouth.      vitamin D (VITAMIN D3) 1000 units Tab Take 1,000 Units by mouth once daily.       No current facility-administered medications on file prior to visit.          PHYSICAL EXAM:  Blood pressure 110/70, pulse 85, height 5' 1" (1.549 m), weight 59 kg (130 lb), SpO2 97%.  Wt Readings from Last 3 Encounters:   03/19/25 59 kg (130 lb)   03/12/24 64.4 kg (142 lb)   04/05/23 66.2 kg (146 lb)      Body mass index is 24.56 kg/m².    Physical Exam  Vitals and nursing note reviewed.   Constitutional:       Appearance: Normal appearance. She is normal weight.   HENT:      Head: Normocephalic and atraumatic.      Right Ear: External ear normal.      Left Ear: External ear normal.   Eyes:      General: No scleral icterus.        Right eye: No discharge.         Left eye: No discharge.      Extraocular Movements: Extraocular movements intact.      Conjunctiva/sclera: Conjunctivae normal.      Pupils: Pupils are equal, round, and reactive to light.   Cardiovascular:      Rate and Rhythm: Normal rate and regular rhythm.      Pulses: Normal pulses.      Heart sounds: Normal heart sounds. No murmur heard.     No friction rub. No gallop.   Pulmonary:      Effort: Pulmonary effort is normal.      Breath sounds: Normal breath sounds. No wheezing, rhonchi or rales.   Chest:      Chest wall: No tenderness.   Abdominal:      General: Abdomen is flat. Bowel sounds are normal. There is no distension.      Palpations: Abdomen is soft.      Tenderness: There is no abdominal tenderness. There is no guarding or rebound.   Musculoskeletal:         General: No swelling or tenderness. Normal range of motion.      Cervical back: Normal range of motion and neck supple.   Skin:     General: Skin is warm and dry.      Findings: No erythema or rash.   Neurological:      General: No focal deficit present.      Mental Status: She is alert and " oriented to person, place, and time.      Cranial Nerves: No cranial nerve deficit.      Motor: No weakness.      Gait: Gait normal.   Psychiatric:         Mood and Affect: Mood normal.         Behavior: Behavior normal.         Thought Content: Thought content normal.         Judgment: Judgment normal.          LABS REVIEWED:  Lab Results   Component Value Date    WBC 6.55 12/20/2022    RBC 4.31 12/20/2022    HGB 13.1 12/20/2022    HCT 41.5 12/20/2022    MCV 96.3 (H) 12/20/2022    MCH 30.4 12/20/2022    MCHC 31.6 (L) 12/20/2022    RDW 11.9 12/20/2022     12/20/2022    MPV 9.6 12/20/2022    NRBC 0.0 12/20/2022     Lab Results   Component Value Date     12/20/2022    K 3.8 12/20/2022     12/20/2022    CO2 29 12/20/2022    BUN 12 12/20/2022     Lab Results   Component Value Date    AST 16 09/14/2022    ALT 18 09/14/2022     Lab Results   Component Value Date    GLU 91 12/20/2022     Lab Results   Component Value Date    CHOL 183 09/14/2022    HDL 62 (H) 09/14/2022    TRIG 163 (H) 09/14/2022    CHOLHDL 3.0 09/14/2022       CARDIAC STUDIES REVIEWED:    OTHER IMAGING STUDIES REVIEWED:        ASSESSMENT: PLAN:  1. Hyperlipidemia: controled on Praulent, continue same  2. Hypertension: controlled.   3. GERD - controlled with OTC meds.   4. Hypothyroid: on replacement, asymptomatic, is scheduled for TSH with primary care.   5. Anxiety : controlled on current meds  6. Hearing loss, is scheduled for cochlear implant in left ear, is at low cardiovascular risk for planned procedure.     Continue Praulent, follow up in one year, sooner if symptoms change.

## 2025-03-21 LAB
OHS QRS DURATION: 70 MS
OHS QTC CALCULATION: 405 MS

## 2025-06-12 ENCOUNTER — HOSPITAL ENCOUNTER (OUTPATIENT)
Dept: RADIOLOGY | Facility: HOSPITAL | Age: 70
Discharge: HOME | End: 2025-06-12
Attending: RADIOLOGY
Payer: MEDICARE

## 2025-06-12 DIAGNOSIS — Z12.31 SCREENING MAMMOGRAM FOR BREAST CANCER: ICD-10-CM

## 2025-06-12 PROCEDURE — 77063 BREAST TOMOSYNTHESIS BI: CPT | Mod: 26,,, | Performed by: RADIOLOGY

## 2025-06-12 PROCEDURE — 77063 BREAST TOMOSYNTHESIS BI: CPT | Mod: TC

## 2025-06-12 PROCEDURE — 77067 SCR MAMMO BI INCL CAD: CPT | Mod: 26,,, | Performed by: RADIOLOGY
